# Patient Record
Sex: MALE | Race: WHITE | NOT HISPANIC OR LATINO | URBAN - METROPOLITAN AREA
[De-identification: names, ages, dates, MRNs, and addresses within clinical notes are randomized per-mention and may not be internally consistent; named-entity substitution may affect disease eponyms.]

---

## 2024-06-04 ENCOUNTER — OFFICE VISIT (OUTPATIENT)
Dept: URBAN - METROPOLITAN AREA CLINIC 98 | Facility: CLINIC | Age: 64
End: 2024-06-04
Payer: COMMERCIAL

## 2024-06-04 ENCOUNTER — LAB OUTSIDE AN ENCOUNTER (OUTPATIENT)
Dept: URBAN - METROPOLITAN AREA CLINIC 98 | Facility: CLINIC | Age: 64
End: 2024-06-04

## 2024-06-04 VITALS
DIASTOLIC BLOOD PRESSURE: 69 MMHG | SYSTOLIC BLOOD PRESSURE: 117 MMHG | HEIGHT: 72 IN | WEIGHT: 177 LBS | BODY MASS INDEX: 23.98 KG/M2 | HEART RATE: 79 BPM | TEMPERATURE: 97.1 F

## 2024-06-04 DIAGNOSIS — R19.5 OCCULT BLOOD POSITIVE STOOL: ICD-10-CM

## 2024-06-04 PROCEDURE — 99202 OFFICE O/P NEW SF 15 MIN: CPT | Performed by: INTERNAL MEDICINE

## 2024-06-04 NOTE — HPI-TODAY'S VISIT:
Here on kind referral from Dr Fritz Alonzo for positive heme occult stool testing  A copy of this note will be sent to Dr Alonzo's attention  Pt reports his last colonoscopy 4yr ago : normal but polyp removed ? pt is unclear  not seeing any blood in colon  but now with positive hemeoccult  no anemia per Dr Alonzo's note ; normal CMP ; normal lipids  . had hernia re-repair December : left inguinal  Dr Taiwo Burt  doing well . if he eats too much - will throw up

## 2024-06-05 ENCOUNTER — DASHBOARD ENCOUNTERS (OUTPATIENT)
Age: 64
End: 2024-06-05

## 2024-06-26 ENCOUNTER — OUT OF OFFICE VISIT (OUTPATIENT)
Dept: URBAN - METROPOLITAN AREA SURGERY CENTER 18 | Facility: SURGERY CENTER | Age: 64
End: 2024-06-26
Payer: COMMERCIAL

## 2024-06-26 ENCOUNTER — OFFICE VISIT (OUTPATIENT)
Dept: URBAN - METROPOLITAN AREA SURGERY CENTER 18 | Facility: SURGERY CENTER | Age: 64
End: 2024-06-26

## 2024-06-26 DIAGNOSIS — R19.5 HEME POSITIVE STOOL: ICD-10-CM

## 2024-06-26 DIAGNOSIS — K64.8 INTERNAL HEMORRHOIDS: ICD-10-CM

## 2024-06-26 PROCEDURE — 00811 ANES LWR INTST NDSC NOS: CPT | Performed by: NURSE ANESTHETIST, CERTIFIED REGISTERED

## 2024-07-01 ENCOUNTER — TELEPHONE ENCOUNTER (OUTPATIENT)
Dept: URBAN - METROPOLITAN AREA CLINIC 98 | Facility: CLINIC | Age: 64
End: 2024-07-01

## 2024-12-13 ENCOUNTER — OFFICE VISIT (OUTPATIENT)
Dept: URBAN - METROPOLITAN AREA CLINIC 98 | Facility: CLINIC | Age: 64
End: 2024-12-13
Payer: COMMERCIAL

## 2024-12-13 ENCOUNTER — LAB OUTSIDE AN ENCOUNTER (OUTPATIENT)
Dept: URBAN - METROPOLITAN AREA CLINIC 98 | Facility: CLINIC | Age: 64
End: 2024-12-13

## 2024-12-13 VITALS
TEMPERATURE: 97.1 F | HEART RATE: 114 BPM | HEIGHT: 72 IN | BODY MASS INDEX: 24.92 KG/M2 | WEIGHT: 184 LBS | SYSTOLIC BLOOD PRESSURE: 123 MMHG | DIASTOLIC BLOOD PRESSURE: 96 MMHG

## 2024-12-13 DIAGNOSIS — R11.0 NAUSEA: ICD-10-CM

## 2024-12-13 DIAGNOSIS — K64.8 INTERNAL HEMORRHOIDS: ICD-10-CM

## 2024-12-13 DIAGNOSIS — R93.89 IMAGING ABNORMALITY: ICD-10-CM

## 2024-12-13 DIAGNOSIS — R12 HEARTBURN: ICD-10-CM

## 2024-12-13 DIAGNOSIS — Z12.11 SCREEN FOR COLON CANCER: ICD-10-CM

## 2024-12-13 PROBLEM — 168501001: Status: ACTIVE | Noted: 2024-12-13

## 2024-12-13 PROCEDURE — 99213 OFFICE O/P EST LOW 20 MIN: CPT

## 2024-12-13 RX ORDER — OMEPRAZOLE MAGNESIUM 10 MG/1
1 PACKET 1/2 TO 1 HOUR BEFORE MORNING MEAL MIXED WITH 15 ML OF WATER GRANULE, DELAYED RELEASE ORAL ONCE A DAY
Status: ACTIVE | COMMUNITY
Start: 2024-12-13

## 2024-12-13 RX ORDER — QUETIAPINE 25 MG/1
1 TABLET AT BEDTIME TABLET, FILM COATED ORAL ONCE A DAY
Status: ACTIVE | COMMUNITY
Start: 2024-12-13

## 2024-12-13 RX ORDER — PAROXETINE HYDROCHLORIDE HEMIHYDRATE 37.5 MG/1
1 TABLET IN THE MORNING TABLET, FILM COATED, EXTENDED RELEASE ORAL ONCE A DAY
Status: ACTIVE | COMMUNITY
Start: 2024-12-13

## 2024-12-13 NOTE — HPI-TODAY'S VISIT:
6/4/24- Dr. Ogden Here on kind referral from Dr Constantino Alonzo for positive heme occult stool testing  A copy of this note will be sent to Dr Alonzo's attention  Pt reports his last colonoscopy 4yr ago : normal but polyp removed ? pt is unclear  not seeing any blood in colon  but now with positive heme occult  no anemia per Dr Alonzo's note ; normal CMP ; normal lipids . had  hernia re-repair December : left inguinal  Dr Taiwo Burt  doing well. if he ea ts too much - will throw up  12/13/24- Tracy Ojeda, NP - 65 yo male here to follow-up after work-up blood in stool and abnormal imaging - PCP Dr. Constantino Alonzo - Previously seen by Dr. Ogden, ordered colonoscopy to assess - Blood in stool from internal hemorrhoids- asymptomatic - Patient denies seeing blood in stool - noted heme occult positive - PCP ordered CT scan for abdominal pain - gastric wall thickening - Nausea in the mornings, no vomiting - Heartburn daily Prilosec - No dysphagia - No unintentional weight loss Reviewed - Colonoscopy 6/26/24- Hemorrhoids found on perianal exam. Large internal hemorrhoids. No specimens collected. - Labs 11/25/2024-hemoglobin 15, hematocrit 44.3, MCV 91.9, platelets 217, sodium 140, potassium 4.0, creatinine 0.98, albumin 4.8, total bilirubin 0.9, alkaline phosphatase 52, AST 25, ALT 25 -CT abdomen pelvis with contrast 11/26/2024-liver is normal and contour.  Hepatic granulomata.  Spleen, pancreas, and adrenal glands unremarkable.  No evidence of bowel obstruction or free air.  Mild prominence of the gastric wall, which may relate to underdistention, however gastritis can have a similar appearance in the appropriate clinical setting.

## 2024-12-18 ENCOUNTER — CLAIMS CREATED FROM THE CLAIM WINDOW (OUTPATIENT)
Dept: URBAN - METROPOLITAN AREA CLINIC 4 | Facility: CLINIC | Age: 64
End: 2024-12-18
Payer: COMMERCIAL

## 2024-12-18 ENCOUNTER — OFFICE VISIT (OUTPATIENT)
Dept: URBAN - METROPOLITAN AREA SURGERY CENTER 18 | Facility: SURGERY CENTER | Age: 64
End: 2024-12-18
Payer: COMMERCIAL

## 2024-12-18 DIAGNOSIS — K31.89 OTHER DISEASES OF STOMACH AND DUODENUM: ICD-10-CM

## 2024-12-18 DIAGNOSIS — K21.9 GERD: ICD-10-CM

## 2024-12-18 DIAGNOSIS — R93.3 ABNORMAL FINDINGS ON DIAGNOSTIC IMAGING OF OTHER PARTS OF DIGESTIVE TRACT: ICD-10-CM

## 2024-12-18 DIAGNOSIS — K29.80 DUODENITIS WITHOUT BLEEDING: ICD-10-CM

## 2024-12-18 DIAGNOSIS — K29.90 GASTRODUODENITIS, UNSPECIFIED, WITHOUT BLEEDING: ICD-10-CM

## 2024-12-18 DIAGNOSIS — K29.80 PEPTIC DUODENITIS: ICD-10-CM

## 2024-12-18 PROCEDURE — 88312 SPECIAL STAINS GROUP 1: CPT | Performed by: PATHOLOGY

## 2024-12-18 PROCEDURE — 00731 ANES UPR GI NDSC PX NOS: CPT | Performed by: NURSE ANESTHETIST, CERTIFIED REGISTERED

## 2024-12-18 PROCEDURE — 43239 EGD BIOPSY SINGLE/MULTIPLE: CPT | Performed by: INTERNAL MEDICINE

## 2024-12-18 PROCEDURE — 88305 TISSUE EXAM BY PATHOLOGIST: CPT | Performed by: PATHOLOGY

## 2024-12-18 RX ORDER — OMEPRAZOLE MAGNESIUM 10 MG/1
1 PACKET 1/2 TO 1 HOUR BEFORE MORNING MEAL MIXED WITH 15 ML OF WATER GRANULE, DELAYED RELEASE ORAL ONCE A DAY
Status: ACTIVE | COMMUNITY
Start: 2024-12-13

## 2024-12-18 RX ORDER — PAROXETINE HYDROCHLORIDE HEMIHYDRATE 37.5 MG/1
1 TABLET IN THE MORNING TABLET, FILM COATED, EXTENDED RELEASE ORAL ONCE A DAY
Status: ACTIVE | COMMUNITY
Start: 2024-12-13

## 2024-12-18 RX ORDER — QUETIAPINE 25 MG/1
1 TABLET AT BEDTIME TABLET, FILM COATED ORAL ONCE A DAY
Status: ACTIVE | COMMUNITY
Start: 2024-12-13